# Patient Record
Sex: FEMALE | Race: WHITE | URBAN - METROPOLITAN AREA
[De-identification: names, ages, dates, MRNs, and addresses within clinical notes are randomized per-mention and may not be internally consistent; named-entity substitution may affect disease eponyms.]

---

## 2021-03-29 ENCOUNTER — OFFICE VISIT (OUTPATIENT)
Dept: FAMILY MEDICINE CLINIC | Facility: CLINIC | Age: 31
End: 2021-03-29
Payer: COMMERCIAL

## 2021-03-29 VITALS
WEIGHT: 165 LBS | TEMPERATURE: 99 F | HEART RATE: 88 BPM | HEIGHT: 61 IN | BODY MASS INDEX: 31.15 KG/M2 | OXYGEN SATURATION: 99 % | RESPIRATION RATE: 18 BRPM

## 2021-03-29 DIAGNOSIS — Z23 NEED FOR TDAP VACCINATION: Primary | ICD-10-CM

## 2021-03-29 PROCEDURE — 3008F BODY MASS INDEX DOCD: CPT | Performed by: PHYSICIAN ASSISTANT

## 2021-03-29 PROCEDURE — 90471 IMMUNIZATION ADMIN: CPT | Performed by: PHYSICIAN ASSISTANT

## 2021-03-29 PROCEDURE — 90715 TDAP VACCINE 7 YRS/> IM: CPT | Performed by: PHYSICIAN ASSISTANT

## 2021-03-29 NOTE — PROGRESS NOTES
Here for tdap vaccine. Reports no previous reaction to any vaccine. Feels well today. Consent completed and reviewed. VIS given. TDAP vaccine administered to left deltoid. Pt tolerated well.

## 2024-02-16 ENCOUNTER — LAB ENCOUNTER (OUTPATIENT)
Dept: LAB | Age: 34
End: 2024-02-16
Payer: COMMERCIAL

## 2024-02-16 ENCOUNTER — OFFICE VISIT (OUTPATIENT)
Dept: FAMILY MEDICINE CLINIC | Facility: CLINIC | Age: 34
End: 2024-02-16
Payer: COMMERCIAL

## 2024-02-16 VITALS
HEART RATE: 85 BPM | BODY MASS INDEX: 40.31 KG/M2 | TEMPERATURE: 98 F | HEIGHT: 61 IN | WEIGHT: 213.5 LBS | DIASTOLIC BLOOD PRESSURE: 81 MMHG | RESPIRATION RATE: 16 BRPM | OXYGEN SATURATION: 98 % | SYSTOLIC BLOOD PRESSURE: 117 MMHG

## 2024-02-16 DIAGNOSIS — Z71.6 ENCOUNTER FOR SMOKING CESSATION COUNSELING: ICD-10-CM

## 2024-02-16 DIAGNOSIS — E66.01 CLASS 3 SEVERE OBESITY WITHOUT SERIOUS COMORBIDITY WITH BODY MASS INDEX (BMI) OF 40.0 TO 44.9 IN ADULT, UNSPECIFIED OBESITY TYPE (HCC): ICD-10-CM

## 2024-02-16 DIAGNOSIS — Z00.00 ROUTINE PHYSICAL EXAMINATION: ICD-10-CM

## 2024-02-16 DIAGNOSIS — N94.6 DYSMENORRHEA: ICD-10-CM

## 2024-02-16 DIAGNOSIS — Z00.00 ROUTINE PHYSICAL EXAMINATION: Primary | ICD-10-CM

## 2024-02-16 DIAGNOSIS — K64.9 HEMORRHOIDS, UNSPECIFIED HEMORRHOID TYPE: ICD-10-CM

## 2024-02-16 LAB
ALBUMIN SERPL-MCNC: 4.3 G/DL (ref 3.2–4.8)
ALBUMIN/GLOB SERPL: 1.3 {RATIO} (ref 1–2)
ALP LIVER SERPL-CCNC: 47 U/L
ALT SERPL-CCNC: 20 U/L
ANION GAP SERPL CALC-SCNC: 6 MMOL/L (ref 0–18)
AST SERPL-CCNC: 20 U/L (ref ?–34)
BASOPHILS # BLD AUTO: 0.04 X10(3) UL (ref 0–0.2)
BASOPHILS NFR BLD AUTO: 0.5 %
BILIRUB SERPL-MCNC: 0.3 MG/DL (ref 0.3–1.2)
BUN BLD-MCNC: 14 MG/DL (ref 9–23)
BUN/CREAT SERPL: 12.7 (ref 10–20)
CALCIUM BLD-MCNC: 9.5 MG/DL (ref 8.7–10.4)
CHLORIDE SERPL-SCNC: 107 MMOL/L (ref 98–112)
CHOLEST SERPL-MCNC: 183 MG/DL (ref ?–200)
CO2 SERPL-SCNC: 27 MMOL/L (ref 21–32)
CREAT BLD-MCNC: 1.1 MG/DL
DEPRECATED HBV CORE AB SER IA-ACNC: 23.5 NG/ML
DEPRECATED RDW RBC AUTO: 42.2 FL (ref 35.1–46.3)
EGFRCR SERPLBLD CKD-EPI 2021: 68 ML/MIN/1.73M2 (ref 60–?)
EOSINOPHIL # BLD AUTO: 0.23 X10(3) UL (ref 0–0.7)
EOSINOPHIL NFR BLD AUTO: 2.7 %
ERYTHROCYTE [DISTWIDTH] IN BLOOD BY AUTOMATED COUNT: 12.8 % (ref 11–15)
FASTING PATIENT LIPID ANSWER: NO
FASTING STATUS PATIENT QL REPORTED: NO
GLOBULIN PLAS-MCNC: 3.2 G/DL (ref 2.8–4.4)
GLUCOSE BLD-MCNC: 95 MG/DL (ref 70–99)
HCT VFR BLD AUTO: 34.4 %
HDLC SERPL-MCNC: 50 MG/DL (ref 40–59)
HGB BLD-MCNC: 11.6 G/DL
IMM GRANULOCYTES # BLD AUTO: 0.02 X10(3) UL (ref 0–1)
IMM GRANULOCYTES NFR BLD: 0.2 %
IRON SATN MFR SERPL: 17 %
IRON SERPL-MCNC: 53 UG/DL
LDLC SERPL CALC-MCNC: 96 MG/DL (ref ?–100)
LYMPHOCYTES # BLD AUTO: 3.4 X10(3) UL (ref 1–4)
LYMPHOCYTES NFR BLD AUTO: 39.3 %
MCH RBC QN AUTO: 30.2 PG (ref 26–34)
MCHC RBC AUTO-ENTMCNC: 33.7 G/DL (ref 31–37)
MCV RBC AUTO: 89.6 FL
MONOCYTES # BLD AUTO: 0.58 X10(3) UL (ref 0.1–1)
MONOCYTES NFR BLD AUTO: 6.7 %
NEUTROPHILS # BLD AUTO: 4.39 X10 (3) UL (ref 1.5–7.7)
NEUTROPHILS # BLD AUTO: 4.39 X10(3) UL (ref 1.5–7.7)
NEUTROPHILS NFR BLD AUTO: 50.6 %
NONHDLC SERPL-MCNC: 133 MG/DL (ref ?–130)
OSMOLALITY SERPL CALC.SUM OF ELEC: 290 MOSM/KG (ref 275–295)
PLATELET # BLD AUTO: 339 10(3)UL (ref 150–450)
POTASSIUM SERPL-SCNC: 4 MMOL/L (ref 3.5–5.1)
PROT SERPL-MCNC: 7.5 G/DL (ref 5.7–8.2)
RBC # BLD AUTO: 3.84 X10(6)UL
SODIUM SERPL-SCNC: 140 MMOL/L (ref 136–145)
TIBC SERPL-MCNC: 317 UG/DL (ref 250–425)
TRANSFERRIN SERPL-MCNC: 213 MG/DL (ref 250–380)
TRIGL SERPL-MCNC: 220 MG/DL (ref 30–149)
TSI SER-ACNC: 0.69 MIU/ML (ref 0.55–4.78)
VLDLC SERPL CALC-MCNC: 36 MG/DL (ref 0–30)
WBC # BLD AUTO: 8.7 X10(3) UL (ref 4–11)

## 2024-02-16 PROCEDURE — 84466 ASSAY OF TRANSFERRIN: CPT

## 2024-02-16 PROCEDURE — 80061 LIPID PANEL: CPT

## 2024-02-16 PROCEDURE — 83036 HEMOGLOBIN GLYCOSYLATED A1C: CPT

## 2024-02-16 PROCEDURE — 84443 ASSAY THYROID STIM HORMONE: CPT

## 2024-02-16 PROCEDURE — 83540 ASSAY OF IRON: CPT

## 2024-02-16 PROCEDURE — 36415 COLL VENOUS BLD VENIPUNCTURE: CPT

## 2024-02-16 PROCEDURE — 99385 PREV VISIT NEW AGE 18-39: CPT

## 2024-02-16 PROCEDURE — 82728 ASSAY OF FERRITIN: CPT

## 2024-02-16 PROCEDURE — 85025 COMPLETE CBC W/AUTO DIFF WBC: CPT

## 2024-02-16 PROCEDURE — 80053 COMPREHEN METABOLIC PANEL: CPT

## 2024-02-16 RX ORDER — CEPHALEXIN 500 MG/1
CAPSULE ORAL
COMMUNITY
End: 2024-02-16 | Stop reason: ALTCHOICE

## 2024-02-16 RX ORDER — HYDROCODONE BITARTRATE AND ACETAMINOPHEN 5; 325 MG/1; MG/1
TABLET ORAL
COMMUNITY
End: 2024-02-16 | Stop reason: ALTCHOICE

## 2024-02-16 RX ORDER — HYDROCORTISONE 25 MG/G
CREAM TOPICAL
Qty: 28 G | Refills: 0 | Status: SHIPPED | OUTPATIENT
Start: 2024-02-16

## 2024-02-16 RX ORDER — HYDROCORTISONE ACETATE 25 MG/1
25 SUPPOSITORY RECTAL 2 TIMES DAILY
Qty: 14 SUPPOSITORY | Refills: 0 | Status: SHIPPED | OUTPATIENT
Start: 2024-02-16 | End: 2024-02-23

## 2024-02-16 RX ORDER — AMOXICILLIN AND CLAVULANATE POTASSIUM 875; 125 MG/1; MG/1
TABLET, FILM COATED ORAL
COMMUNITY
End: 2024-02-16 | Stop reason: ALTCHOICE

## 2024-02-16 RX ORDER — LEVONORGESTREL AND ETHINYL ESTRADIOL 0.1-0.02MG
1 KIT ORAL DAILY
Qty: 28 TABLET | Refills: 2 | Status: SHIPPED | OUTPATIENT
Start: 2024-02-16 | End: 2025-02-15

## 2024-02-16 RX ORDER — NICOTINE 21 MG/24HR
1 PATCH, TRANSDERMAL 24 HOURS TRANSDERMAL EVERY 24 HOURS
Qty: 28 EACH | Refills: 0 | Status: SHIPPED | OUTPATIENT
Start: 2024-02-16

## 2024-02-16 NOTE — PROGRESS NOTES
HPI:    Patient ID: Barbara Lan is a 33 year old female.    HPI  Chief Complaint   Patient presents with    Physical     Routine px; Will see gyne for pap in April (pt refused today); discuss pain with period recently     Patient here in office for physical.  States last Pap completed over 3-5 years ago, was see OB/GYN in in April 2024 for exam.  Complains of dysmenorrhea with a last menstrual cycle.  Interested in starting oral contraceptives.  States she smokes cigarettes (1 pack every 2 weeks).  Interested in smoking cessation.      Also complains of rectal pain, occurred 1 week prior to her recent menstrual cycle.  Per patient, she also noted blood when wiping with tissue.  Reports mild constipation prior to menstrual cycle.       Review of Systems   Constitutional: Negative.  Negative for fever.   HENT: Negative.  Negative for ear pain and sore throat.    Eyes:  Negative for visual disturbance.   Respiratory: Negative.  Negative for cough and shortness of breath.    Cardiovascular: Negative.  Negative for chest pain and palpitations.   Gastrointestinal:  Positive for constipation and rectal pain. Negative for abdominal pain and blood in stool.   Genitourinary:  Positive for menstrual problem. Negative for dysuria, enuresis, frequency and urgency.   Musculoskeletal: Negative.  Negative for arthralgias and myalgias.   Skin: Negative.  Negative for rash.   Neurological: Negative.  Negative for dizziness, light-headedness and headaches.   Hematological:  Does not bruise/bleed easily.   Psychiatric/Behavioral: Negative.         /81   Pulse 85   Temp 98.4 °F (36.9 °C) (Temporal)   Resp 16   Ht 5' 1\" (1.549 m)   Wt 213 lb 8 oz (96.8 kg)   LMP 02/01/2024 (Exact Date)   SpO2 98%   BMI 40.34 kg/m²     Past Medical History:   Diagnosis Date    Recurrent acute otitis media      Past Surgical History:   Procedure Laterality Date    BREAST SURGERY N/A 2015    Breast duct removal surgery     Social History      Socioeconomic History    Marital status:      Spouse name: Not on file    Number of children: Not on file    Years of education: Not on file    Highest education level: Not on file   Occupational History    Not on file   Tobacco Use    Smoking status: Some Days     Types: Cigarettes    Smokeless tobacco: Never   Vaping Use    Vaping Use: Never used   Substance and Sexual Activity    Alcohol use: Yes     Comment: socially    Drug use: Not Currently    Sexual activity: Not on file   Other Topics Concern    Not on file   Social History Narrative    Not on file     Social Determinants of Health     Financial Resource Strain: Not on file   Food Insecurity: Not on file   Transportation Needs: Not on file   Physical Activity: Not on file   Stress: Not on file   Social Connections: Not on file   Housing Stability: Not on file     Family History   Problem Relation Age of Onset    Diabetes Maternal Grandmother        Immunization History   Administered Date(s) Administered    Covid-19 Vaccine Handmade Mobile (J&J) 0.5ml 06/24/2021    TDAP 03/29/2021       Health Maintenance   Topic Date Due    Annual Physical  Never done    Tobacco Cessation Counseling 1 Year  Never done    Pap Smear  Never done    Influenza Vaccine (1) 01/01/2025 (Originally 10/1/2023)    COVID-19 Vaccine (2 - 2023-24 season) 01/01/2025 (Originally 9/1/2023)    DTaP,Tdap,and Td Vaccines (2 - Td or Tdap) 03/29/2031    Annual Depression Screening  Completed    Pneumococcal Vaccine: Birth to 64yrs  Aged Out          Current Outpatient Medications   Medication Sig Dispense Refill    nicotine (NICODERM CQ) 14 MG/24HR Transdermal Patch 24 Hr Place 1 patch onto the skin daily. 28 each 0    Levonorgestrel-Ethinyl Estrad (LESSINA) 0.1-20 MG-MCG Oral Tab Take 1 tablet by mouth daily. 28 tablet 2    hydrocortisone (ANUSOL-HC) 25 MG Rectal Suppos Place 1 suppository (25 mg total) rectally 2 (two) times daily for 7 days. 14 suppository 0    hydrocortisone 2.5 %  External Cream Apply to to affected area up to twice per day for hemorrhoids (use externally) 28 g 0     Allergies:No Known Allergies   PHYSICAL EXAM:     Chief Complaint   Patient presents with    Physical     Routine px; Will see gyne for pap in April (pt refused today); discuss pain with period recently      Physical Exam  Vitals reviewed.   Constitutional:       General: She is not in acute distress.     Appearance: Normal appearance. She is well-developed. She is not ill-appearing.   HENT:      Head: Normocephalic and atraumatic.      Right Ear: Tympanic membrane, ear canal and external ear normal. There is no impacted cerumen.      Left Ear: Tympanic membrane, ear canal and external ear normal. There is no impacted cerumen.      Nose: Nose normal. No congestion.      Mouth/Throat:      Mouth: Mucous membranes are moist.      Pharynx: No oropharyngeal exudate or posterior oropharyngeal erythema.   Eyes:      General:         Right eye: No discharge.         Left eye: No discharge.      Extraocular Movements: Extraocular movements intact.      Conjunctiva/sclera: Conjunctivae normal.      Pupils: Pupils are equal, round, and reactive to light.   Cardiovascular:      Rate and Rhythm: Normal rate and regular rhythm.      Heart sounds: Normal heart sounds. No murmur heard.     No friction rub. No gallop.   Pulmonary:      Effort: Pulmonary effort is normal. No respiratory distress.      Breath sounds: Normal breath sounds. No stridor. No wheezing, rhonchi or rales.   Chest:      Chest wall: No tenderness.   Abdominal:      General: Bowel sounds are normal. There is no distension.      Palpations: Abdomen is soft. There is no mass.      Tenderness: There is no abdominal tenderness. There is no right CVA tenderness, left CVA tenderness, guarding or rebound.      Hernia: No hernia is present.   Genitourinary:     Vagina: Normal.      Comments: Inflamed external hemorrhoid  Musculoskeletal:         General: No  tenderness. Normal range of motion.      Cervical back: Normal range of motion and neck supple.   Lymphadenopathy:      Cervical: No cervical adenopathy.   Skin:     General: Skin is warm and dry.      Findings: No rash.   Neurological:      General: No focal deficit present.      Mental Status: She is alert and oriented to person, place, and time.      Cranial Nerves: No cranial nerve deficit.      Sensory: No sensory deficit.      Motor: No weakness.      Deep Tendon Reflexes: Reflexes are normal and symmetric.   Psychiatric:         Mood and Affect: Mood normal.         Behavior: Behavior normal.         Thought Content: Thought content normal.         Judgment: Judgment normal.                ASSESSMENT/PLAN:     Return yearly for physicals  Follow up with dentist every 6 months  Follow up with eye doctor yearly  Recommend aerobic exercise for at least 30mins 5 days a week  Yearly flu shot  Tetanus booster every 10 years (Tdap/ Td)  Labs ordered/ or reviewed if done prior to appointment     Encounter Diagnoses   Name Primary?    Routine physical examination Yes    Dysmenorrhea     Encounter for smoking cessation counseling     Class 3 severe obesity without serious comorbidity with body mass index (BMI) of 40.0 to 44.9 in adult, unspecified obesity type (HCC)     Hemorrhoids, unspecified hemorrhoid type        1. Routine physical examination  - CBC With Differential With Platelet; Future  - Comp Metabolic Panel (14); Future  - Lipid Panel; Future    2. Dysmenorrhea  - Levonorgestrel-Ethinyl Estrad (LESSINA) 0.1-20 MG-MCG Oral Tab, Take 1 tablet by mouth daily.   -Advised patient that she is at a higher risk of blood clots due to smoking.  Instructed patient to go to ER for unilateral leg swelling/warmness/tenderness with shortness of breath, chest tightness, or difficulty breathing   - TSH W Reflex To Free T4 [E]; Future  - Ferritin [E]; Future  - Iron And Tibc [E]; Future    3. Encounter for smoking cessation  counseling  - nicotine (NICODERM CQ) 14 MG/24HR Transdermal Patch 24 Hr, Place 1 patch onto the skin daily.     4. Class 3 severe obesity without serious comorbidity with body mass index (BMI) of 40.0 to 44.9 in adult, unspecified obesity type (HCC)  - [facility] - BLAIR Rajput -  [facility] 9  - Hemoglobin A1C [E]; Future    5. Hemorrhoids, unspecified hemorrhoid type  - hydrocortisone (ANUSOL-HC) 25 MG Rectal Suppos,   Place 1 suppository (25 mg total) rectally 2 (two) times daily for 7 days.   - hydrocortisone 2.5 % External Cream, Apply to to affected area up to twice per day for hemorrhoids (use externally)       Orders Placed This Encounter   Procedures    CBC With Differential With Platelet    Comp Metabolic Panel (14)    Lipid Panel    TSH W Reflex To Free T4 [E]    Ferritin [E]    Iron And Tibc [E]    Hemoglobin A1C [E]       The above note was creating using Dragon speech recognition technology. Please excuse any typos    Meds This Visit:  Requested Prescriptions     Signed Prescriptions Disp Refills    nicotine (NICODERM CQ) 14 MG/24HR Transdermal Patch 24 Hr 28 each 0     Sig: Place 1 patch onto the skin daily.    Levonorgestrel-Ethinyl Estrad (LESSINA) 0.1-20 MG-MCG Oral Tab 28 tablet 2     Sig: Take 1 tablet by mouth daily.    hydrocortisone (ANUSOL-HC) 25 MG Rectal Suppos 14 suppository 0     Sig: Place 1 suppository (25 mg total) rectally 2 (two) times daily for 7 days.    hydrocortisone 2.5 % External Cream 28 g 0     Sig: Apply to to affected area up to twice per day for hemorrhoids (use externally)       Imaging & Referrals:  BARIATRICS - INTERNAL         BLAIR Schneider

## 2024-02-17 LAB
EST. AVERAGE GLUCOSE BLD GHB EST-MCNC: 114 MG/DL (ref 68–126)
HBA1C MFR BLD: 5.6 % (ref ?–5.7)

## 2024-03-14 ENCOUNTER — PATIENT MESSAGE (OUTPATIENT)
Dept: SURGERY | Facility: CLINIC | Age: 34
End: 2024-03-14

## 2024-03-18 ENCOUNTER — TELEMEDICINE (OUTPATIENT)
Dept: SURGERY | Facility: CLINIC | Age: 34
End: 2024-03-18
Payer: COMMERCIAL

## 2024-03-18 VITALS — WEIGHT: 215 LBS | BODY MASS INDEX: 41 KG/M2

## 2024-03-18 DIAGNOSIS — E66.01 MORBID OBESITY WITH BMI OF 40.0-44.9, ADULT (HCC): ICD-10-CM

## 2024-03-18 DIAGNOSIS — E78.5 DYSLIPIDEMIA: Primary | ICD-10-CM

## 2024-03-18 DIAGNOSIS — Z51.81 ENCOUNTER FOR THERAPEUTIC DRUG MONITORING: ICD-10-CM

## 2024-03-18 RX ORDER — TIRZEPATIDE 2.5 MG/.5ML
2.5 INJECTION, SOLUTION SUBCUTANEOUS WEEKLY
Qty: 2 ML | Refills: 1 | Status: SHIPPED | OUTPATIENT
Start: 2024-03-18

## 2024-03-18 NOTE — PROGRESS NOTES
Virtual Video & Audio Check-In    Barbara Lan verbally consents to a Virtual Video & Audio Check-In visit on 24.  Patient has been referred to the [facility] website at [email or web address] to review the yearly Consent to Treat document.    Patient understands and accepts financial responsibility for any deductible, co-insurance and/or co-pays associated with this service.    Duration of the service: 35 minutes.    Summary of topics discussed: Obesity/weight management, Lifestyle and behavior modifications, Medication management.    The Wellness and Weight Loss Consultation Note       Date of Consult:  3/18/2024    Patient:  Barbara Lan  :      1990  MRN:      [id number]    Referring Provider: BLAIR Lopez     Chief Complaint:    Chief Complaint   Patient presents with    Consult    Obesity    Weight Management       SUBJECTIVE     History of Present Illness:  Barbara Lan has been referred to me for evaluation and treatment.       34 yo female.  Presents to clinic for assistance with weight loss/maintenance.   Has reduced smoking significantly recently.    Patient is considering medications and is not considering bariatric surgery for weight loss.    Patient denies any history of eating disorder(s).    Patient is employed: - .  Patient lives with 2 kids, significant other.    Patient's goal weight: 170 lbs ()  Biggest weight loss in the past: 55 lbs  How weight loss was achieved: ketogenic diet  Heaviest weight ever: 230 lbs   Previous use of medical weight loss medications: None     Physical activity: 60 minutes 3 days/week- gym, videos    Sleep: 6 hours/night    Sleep screening:       Past Medical History:   Past Medical History:   Diagnosis Date    Recurrent acute otitis media        OBJECTIVE     Vitals: Wt 215 lb (97.5 kg)   LMP 2024 (Exact Date)   BMI 40.62 kg/m²        Wt Readings from Last 6 Encounters:   24 215 lb (97.5 kg)   24 213  lb 8 oz (96.8 kg)   03/29/21 165 lb (74.8 kg)        Patient Medications:    Current Outpatient Medications   Medication Sig Dispense Refill    Tirzepatide-Weight Management (ZEPBOUND) 2.5 MG/0.5ML Subcutaneous Solution Auto-injector Inject 2.5 mg into the skin once a week. 2 mL 1    hydrocortisone 2.5 % External Cream Apply to to affected area up to twice per day for hemorrhoids (use externally) 28 g 0       Allergies:  Patient has no known allergies.     Comorbidities:  Dyslipidemia     Social History:  Reviewed     Surgical History:    Past Surgical History:   Procedure Laterality Date    BREAST SURGERY N/A 2015    Breast duct removal surgery       Family History:    Family History   Problem Relation Age of Onset    Diabetes Maternal Grandmother        Typical Dietary Intake:  Breakfast AM Snack Lunch PM Snack Dinner   2 cups coffee day  Banana  Yogurt fruit Fruit  Granola bar 5 PM chicken, rice, veggies     After dinner behavior: + fruit   Night eating: -  Portion sizes: -  Binge:   Emotional: +  Depression:  Grazing:   Sweet tooth:  Crunchy/salty:   Etoh: Rare   Drinks mostly water  Soda Drinker: No    Sports Drinks:  No    Juice:  No   Number of restaurant or fast food meals/week:  2 meals/week    Nutritional Goals Reviewed and Discussed:     Eat 3-4 cups of fresh fruit or vegetables daily    Behavior Modifications Reviewed and Discussed:    Eat breakfast, Eat 3 meals per day, Plan meals in advance, Read nutrition labels, Drink 64oz of water per day, Maintain a daily food journal, Utilize portion control strategies to reduce calorie intake, Identify triggers for eating and manage cues, and Eat slowly and take 20 to 30 minutes to complete each meal      ROS:  Constitutional: positive for fatigue  Respiratory: negative  Cardiovascular: negative  Gastrointestinal: negative  Integument/breast: negative  Hematologic/lymphatic: negative  Musculoskeletal:negative  Neurological: negative  Behavioral/Psych:  negative  Endocrine: negative    Physical Exam:  General appearance: alert, appears stated age, cooperative and obese  Head: Normocephalic, without obvious abnormality, atraumatic  Neck: no adenopathy, no carotid bruit, no JVD, supple, symmetrical, trachea midline and thyroid not enlarged, symmetric, no tenderness/mass/nodules  Lungs: clear to auscultation bilaterally  Heart: S1, S2 normal, no murmur, click, rub or gallop, regular rate and rhythm  Abdomen: soft, non-tender; bowel sounds normal; no masses,  no organomegaly and abdomen obese   Extremities: intact, no edema   Pulses: 2+ and symmetric  Skin: intact   Neurologic: Grossly normal      ASSESSMENT         Encounter Diagnosis(ses):   1. Dyslipidemia    2. Morbid obesity with BMI of 40.0-44.9, adult ([facility])    3. Encounter for therapeutic drug monitoring        PLAN         Diagnoses and all orders for this visit:    Dyslipidemia  -     DIETITIAN EDUCATION INITIAL, DIET (INTERNAL)  -     EKG 12 Lead; Future    Morbid obesity with BMI of 40.0-44.9, adult ([facility])  -     Tirzepatide-Weight Management (ZEPBOUND) 2.5 MG/0.5ML Subcutaneous Solution Auto-injector; Inject 2.5 mg into the skin once a week.  -     DIETITIAN EDUCATION INITIAL, DIET (INTERNAL)  -     EKG 12 Lead; Future    Encounter for therapeutic drug monitoring  -     DIETITIAN EDUCATION INITIAL, DIET (INTERNAL)  -     EKG 12 Lead; Future      DYSLIPIDEMIA: Recommend dietary changes and lifestyle modifications as discussed below. Monitor.     Lab Results   Component Value Date/Time    CHOLEST 183 02/16/2024 03:54 PM    LDL 96 02/16/2024 03:54 PM    HDL 50 02/16/2024 03:54 PM    TRIG 220 (H) 02/16/2024 03:54 PM    VLDL 36 (H) 02/16/2024 03:54 PM       OBESITY/WEIGHT GAIN:    Recommended intensive lifestyle and behavioral modifications at this time for weight loss.    Educated patient on lifestyle modifications: Whole Food/Plant Strong/Low Glycemic Index diet, moderate alcohol consumption, reduced sodium  intake to no more than 2,400 mg/day, and at least 150 minutes of moderate physical activity per week.  Avoid processed, poor quality carbohydrates, refined grains, flour, sugar.    Goals for next month:  1. Keep a food log.  2. Drink 64 ounces of non-caloric beverages per day. No fruit juices or regular soda.  3. Aim for 150 minutes moderate exercise per week.    4. Increase fruit and vegetable servings to 5-6 per day.    5. Improve sleep and stress.     Reviewed other labs:  2/16/24- a1c 5.6.  Iron, ferritin, TSH, CMP,   H & H low- mild anemia.   Recommend iron rich foods.     Discussed medication options for weight loss in detail with patient.     Denies personal or family hx medullary thyroid CA, endocrine neoplasia syndrome, pancreatitis hx, suicidal ideation. No renal impairment, severe GI disease, diabetes, pancreatitis risks noted.    Start Zepbound 2.5 mg weekly.   Increase dose monthly as tolerated.    Alternative: phentermine + topiramate    SQ administration teaching provided to patient.   Discussed risks, benefits, and side effects of medication. Avoid pregnancy during use. Contraindications for medication discussed at length. Patient states understanding.     Plan for iron rich foods.     Meet with RD.  Healthy Plate Method.     RTC 2-3 months.     BLAIR Rajput

## 2024-03-18 NOTE — PATIENT INSTRUCTIONS
Zepbound schedule:    2.5 mg once a week for 4 consecutive weeks, then may increase  5 mg once a week for 4 consecutive weeks, then may increase  7.5 mg once a week for 4 consecutive weeks, then may increase  10 mg once a week for 4 consecutive weeks, then may increase  12.5 mg once a week for 4 consecutive weeks, then may increase  15 mg once a week- final dose     Each month, please message me to let me know how you are doing. I can either refill the medication to a higher dose as stated above, or you will have refills at the current/same dose until you work through the side effects which are typically nausea, vomiting, GI upset, heart burn, constipation, loose stool, and/or fatigue.     For now, please start bariatric choice + iron. Choose to eat iron-rich foods. Foods rich in iron include: poultry, seafood, beans, dark green leafy vegetables such as spinach, peas, and almonds.     Aim for 75-80 grams protein/day.  25-30 grams/meal.   3 PM nuts/seeds.   Eat within 1-3 hours upon waking.   Meals between 7 or 9 AM and 7 PM.   6 days on, 1 day off.   Cronometer for tracking.  Add psyllium husk daily. [employer].   Build up to 35-40 grams of fiber/day.   Aim for 64 oz of water/day.    Aim for a total of:  2 fruits a day (avocado, tomato, citrus/oranges, apples, berries)  1/2 cup or medium size    4 non-starchy vegetables/day (1/2 cup cooked; 1 cup raw) (greens, peppers, onions, garlic, broccoli, cauliflower, brussels sprouts, asparagus, etc.)    0-2 starches/day (oatmeal, sweet potato, carrots, brown rice, etc).    3 protein per day (fish, seafood, meat, or plants: salmon, nuts, seeds, shrimp, chicken, turkey, beans, lentils, chickpeas, etc).    2 healthy fats (avocado, avocado oil, olives, olive oil, salmon, nuts, seeds)    I recommend a whole food, plant powered diet with low glycemic index:     Aim for 3 meals a day and 1-2 snacks as needed.    Aim for a protein + produce at each meal time.     Breakfast  ideas:  1. Fruit and nuts/seeds.  2. Eggs scrambled with vegetables.  3. Oatmeal (stovetop), cinnamon, 2 tbsp flaxseed, berries, nuts.  4. Protein shake + fruit. (1 scoop with water/ice). Garden of Life Fit, Nutiva, Jones, and Orgain are good brands.      Snacks:  Raw vegetables and hummus, apples and peanut butter, nuts, seeds, fruit, pecans drizzled with organic honey.      Use the Healthy Plate method for lunch and dinner:  1/2 right side of plate non-starchy vegetables.  Bottom left 1/4 plate protein.  Top left 1/4 starch as desired.      Aim for 150 minutes moderate level exercise weekly with 2-3 days strength training.    Add Magnesium glycinate 200 to 500 mg/day for sleep.   Life Extension. NOW. Garden of Life. Whole Food Brand. Mary[employer]uth's. Pure Encapsulations.     Or consider Natural Calm.   by Natural Vitality  Follow dosage instructions.  Start 1 teaspoon and increase up to 3 teaspoons as needed for sleep.

## 2024-04-24 ENCOUNTER — OFFICE VISIT (OUTPATIENT)
Dept: OBGYN CLINIC | Facility: CLINIC | Age: 34
End: 2024-04-24
Payer: COMMERCIAL

## 2024-04-24 VITALS
HEIGHT: 60 IN | DIASTOLIC BLOOD PRESSURE: 76 MMHG | WEIGHT: 211 LBS | HEART RATE: 76 BPM | SYSTOLIC BLOOD PRESSURE: 109 MMHG | BODY MASS INDEX: 41.43 KG/M2

## 2024-04-24 DIAGNOSIS — Z01.419 NORMAL GYNECOLOGIC EXAMINATION: Primary | ICD-10-CM

## 2024-04-24 PROCEDURE — 99395 PREV VISIT EST AGE 18-39: CPT | Performed by: OBSTETRICS & GYNECOLOGY

## 2024-04-24 RX ORDER — NORETHINDRONE ACETATE AND ETHINYL ESTRADIOL, ETHINYL ESTRADIOL AND FERROUS FUMARATE 1MG-10(24)
1 KIT ORAL DAILY
Qty: 84 TABLET | Refills: 4 | Status: SHIPPED | OUTPATIENT
Start: 2024-04-24 | End: 2025-04-24

## 2024-04-24 NOTE — PROGRESS NOTES
Barbara Lan is a 33 year old female  Patient's last menstrual period was 2024 (exact date).   Chief Complaint   Patient presents with    Annual     Pt here  for annual and discuss irregular menses had reaction to birthcontrol     Pt not taking contraception. States cramps bad during menses.   OBSTETRICS HISTORY:     OB History    Para Term  AB Living   2 2 2         SAB IAB Ectopic Multiple Live Births                  # Outcome Date GA Lbr Antonio/2nd Weight Sex Type Anes PTL Lv   2 Term 13     NORMAL SPONT      1 Term 08     NORMAL SPONT          GYNE HISTORY:     Hx Prior Abnormal Pap: No  Pap Result Notes: per pt normal 2 years ago   Period Cycle (Days): 28 (2024  3:45 PM)  Period Duration (Days): 7 (2024  3:45 PM)  Use of Birth Control (if yes, specify type): None (2024  3:45 PM)  Hx Prior Abnormal Pap: No (2024  3:45 PM)  Pap Result Notes: per pt normal 2 years ago (2024  3:45 PM)         No data to display                  MEDICAL HISTORY:     Past Medical History:    Recurrent acute otitis media       SURGICAL HISTORY:     Past Surgical History:   Procedure Laterality Date    Breast surgery N/A     Breast duct removal surgery       SOCIAL HISTORY:     Social History     Socioeconomic History    Marital status:    Tobacco Use    Smoking status: Some Days     Types: Cigarettes    Smokeless tobacco: Never   Vaping Use    Vaping status: Never Used   Substance and Sexual Activity    Alcohol use: Yes     Comment: socially    Drug use: Not Currently        FAMILY HISTORY:     Family History   Problem Relation Age of Onset    Diabetes Maternal Grandmother        MEDICATIONS:       Current Outpatient Medications:     Norethin-Eth Estrad-Fe Biphas (LO LOESTRIN FE) 1 MG-10 MCG / 10 MCG Oral Tab, Take 1 tablet by mouth daily., Disp: 84 tablet, Rfl: 4    hydrocortisone 2.5 % External Cream, Apply to to affected area up to twice per day for  hemorrhoids (use externally), Disp: 28 g, Rfl: 0    Tirzepatide-Weight Management (ZEPBOUND) 2.5 MG/0.5ML Subcutaneous Solution Auto-injector, Inject 2.5 mg into the skin once a week. (Patient not taking: Reported on 4/24/2024), Disp: 2 mL, Rfl: 1    ALLERGIES:     No Known Allergies      REVIEW OF SYSTEMS:     Constitutional:    denies fever / chills  Eyes:     denies blurred or double vision  Cardiovascular:  denies chest pain or palpitations  Respiratory:    denies shortness of breath  Gastrointestinal:  denies severe abdominal pain, frequent diarrhea or constipation, nausea / vomiting  Genitourinary:    denies dysuria, bothersome incontinence  Skin/Breast:   denies any breast pain, lumps, or discharge  Neurological:    denies frequent severe headaches  Psychiatric:   denies depression or anxiety, thoughts of harming self or others  Heme/Lymph:    denies easy bruising or bleeding      PHYSICAL EXAM:   Blood pressure 109/76, pulse 76, height 5' (1.524 m), weight 211 lb (95.7 kg), last menstrual period 04/09/2024, not currently breastfeeding.  Constitutional:  well developed, well nourished  Head/Face:  normocephalic  Neck/Thyroid: thyroid symmetric, no thyromegaly, no nodules, no adenopathy  Lymphatic: no abnormal supraclavicular or axillary adenopathy is noted  Respiratory:      chest wall symmetric and nontender on palpation, clear to asculation bilateral, no wheezing, rales, ronchi, and resonance normal upon percussion  Cardiovascular: chest normal in appearance, regular rate and rhythm, no murmurs, PMI palpated midclavicular line  Breast:   normal without palpable masses, tenderness, asymmetry, nipple discharge, nipple retraction or skin changes  Abdomen:   soft, nontender, nondistended, no masses  Skin/Hair:  no unusual rashes or bruises  Extremities:  no edema, no cyanosis, non tender bilaterally  Psychiatric:   oriented to time, place, person and situation. Appropriate mood and affect    Pelvic  Exam:  External Genitalia:  normal appearance, hair distribution, and no lesions  Urethral Meatus:   normal in size, location, without lesions   Bladder:    no fullness, masses or tenderness  Vagina:    normal appearance without lesions, no abnormal discharge  Cervix:    No lesions, normal friability   Uterus:    normal in size, 8 wk sized, normal contour, position, mobility, without  motion tenderness  Adnexa:   normal without masses or tenderness  Perineum:   normal  Anus: no hemorroids         ASSESSMENT & PLAN:     Barbara was seen today for annual.    Diagnoses and all orders for this visit:    Normal gynecologic examination  -     ThinPrep Pap with HPV Reflex, Chlamydia/GC; Future  -     Chlamydia/Gc Amplification; Future  -     Norethin-Eth Estrad-Fe Biphas (LO LOESTRIN FE) 1 MG-10 MCG / 10 MCG Oral Tab; Take 1 tablet by mouth daily.  Nutrition, weight screening and exercise were discussed with the patient.  Exercise should encompass approximately 150 minutes/week.  Self breast exam counseling was also given.  I advised the patient to avoid tobacco, drugs and alcohol.  Influenza vaccine was offered if seasonally appropriate.  HPV and STD prevention counseling was given.  Health maintenance checklist  was reviewed including Pap smear, cervical cultures, and mammogram screening if age-appropriate.  Appropriate follow-up scheduling was discussed with the patient.    Pap done  Contraceptive counseling given.  Efficacy, side effects, risks and benefits of oral contraceptive pills, NuvaRing, Depo-Provera, intrauterine device, patch, Nexplanon, abstinence, condoms, and permanent sterilization were discussed with the patient and patient verbalized understanding and all questions were answered.  I advised condoms or abstinence as backup during the first month of contraception use for pregnancy prevention and I advised condom use at all times for HPV and STD prevention if clinically appropriate.  Pt wants ocp  A  prescription for oral contraceptive pills was given for 13 months use.  Backup contraception during the first 28 days of use was advised.  Proper use of the pill including attempts at taking the pill at the same time every day discussed with the patient.  Doubling up the day after missed pill day was advised.  If 2 days are missed then backup contraception for another 28 days was advised.  Risks of increased blood clot formation with use of the pill was discussed with the patient.  She denies history of blood clots, liver disease, clotting disorders, breast cancer, or depression.  Benefits of the pill including proposed decrease in ovarian and endometrial cancer were discussed with the patient.  Light menses during use of the pill or amenorrhea during use of the pill are both normal and discussed with the patient.  The patient does not have hypertension or uncontrolled diabetes.  Presc loloestrin given        FOLLOW-UP     Return in about 1 year (around 4/24/2025) for Annual exam.      Jerry Putnam MD  4/24/2024

## 2024-04-25 LAB
C TRACH DNA SPEC QL NAA+PROBE: NEGATIVE
N GONORRHOEA DNA SPEC QL NAA+PROBE: NEGATIVE

## 2024-05-14 LAB — HPV I/H RISK 1 DNA SPEC QL NAA+PROBE: NEGATIVE

## 2024-08-07 DIAGNOSIS — Z01.419 NORMAL GYNECOLOGIC EXAMINATION: ICD-10-CM

## 2024-08-20 RX ORDER — NORETHINDRONE ACETATE AND ETHINYL ESTRADIOL, ETHINYL ESTRADIOL AND FERROUS FUMARATE 1MG-10(24)
1 KIT ORAL DAILY
Qty: 84 TABLET | Refills: 4 | Status: SHIPPED | OUTPATIENT
Start: 2024-08-20 | End: 2025-08-20

## 2025-04-30 ENCOUNTER — OFFICE VISIT (OUTPATIENT)
Dept: OBGYN CLINIC | Facility: CLINIC | Age: 35
End: 2025-04-30
Payer: COMMERCIAL

## 2025-04-30 ENCOUNTER — TELEPHONE (OUTPATIENT)
Dept: OBGYN CLINIC | Facility: CLINIC | Age: 35
End: 2025-04-30

## 2025-04-30 VITALS
WEIGHT: 202 LBS | HEIGHT: 60 IN | SYSTOLIC BLOOD PRESSURE: 108 MMHG | DIASTOLIC BLOOD PRESSURE: 72 MMHG | BODY MASS INDEX: 39.66 KG/M2

## 2025-04-30 DIAGNOSIS — Z71.6 ENCOUNTER FOR TOBACCO USE CESSATION COUNSELING: ICD-10-CM

## 2025-04-30 DIAGNOSIS — Z01.419 NORMAL GYNECOLOGIC EXAMINATION: Primary | ICD-10-CM

## 2025-04-30 PROCEDURE — 99395 PREV VISIT EST AGE 18-39: CPT | Performed by: OBSTETRICS & GYNECOLOGY

## 2025-04-30 RX ORDER — DROSPIRENONE 4 MG/1
1 TABLET, FILM COATED ORAL DAILY
Qty: 84 TABLET | Refills: 4 | Status: SHIPPED | OUTPATIENT
Start: 2025-04-30 | End: 2026-04-30

## 2025-04-30 NOTE — PROGRESS NOTES
Barbara Lan is a 34 year old female  Patient's last menstrual period was 2025.   Chief Complaint   Patient presents with    Annual     Pt here for annual exam      Taking ocp, loestrin fe. She smokes.   OBSTETRICS HISTORY:     OB History    Para Term  AB Living   2 2 2      SAB IAB Ectopic Multiple Live Births             # Outcome Date GA Lbr Antonio/2nd Weight Sex Type Anes PTL Lv   2 Term 13     NORMAL SPONT      1 Term 08     NORMAL SPONT          GYNE HISTORY:     Hx Prior Abnormal Pap: No  Pap Date: 24  Pap Result Notes: wnl   Period Cycle (Days): irregular (2025  2:02 PM)  Period Duration (Days): 4 (2025  2:02 PM)  Use of Birth Control (if yes, specify type): OCP (2025  2:02 PM)  Hx Prior Abnormal Pap: No (2025  2:02 PM)  Pap Date: 24 (2025  2:02 PM)  Pap Result Notes: wnl (2025  2:02 PM)        Latest Ref Rng & Units 2024     4:51 PM   RECENT PAP RESULTS   INTERPRETATION/RESULT: Negative for intraepithelial lesion or malignancy Negative for intraepithelial lesion or malignancy    HPV Negative Negative          MEDICAL HISTORY:     Past Medical History[1]    SURGICAL HISTORY:     Past Surgical History[2]    SOCIAL HISTORY:     Short Social Hx on File[3]     FAMILY HISTORY:     Family History[4]    MEDICATIONS:     Medications - Current[5]    ALLERGIES:     Allergies[6]      REVIEW OF SYSTEMS:     Constitutional:    denies fever / chills  Eyes:     denies blurred or double vision  Cardiovascular:  denies chest pain or palpitations  Respiratory:    denies shortness of breath  Gastrointestinal:  denies severe abdominal pain, frequent diarrhea or constipation, nausea / vomiting  Genitourinary:    denies dysuria, bothersome incontinence  Skin/Breast:   denies any breast pain, lumps, or discharge  Neurological:    denies frequent severe headaches  Psychiatric:   denies depression or anxiety, thoughts of harming self or  others  Heme/Lymph:    denies easy bruising or bleeding      PHYSICAL EXAM:   Blood pressure 108/72, height 5' (1.524 m), weight 202 lb (91.6 kg), last menstrual period 04/04/2025, not currently breastfeeding.  Constitutional:  well developed, well nourished  Head/Face:  normocephalic  Neck/Thyroid: thyroid symmetric, no thyromegaly, no nodules, no adenopathy  Lymphatic: no abnormal supraclavicular or axillary adenopathy is noted  Respiratory:      chest wall symmetric and nontender on palpation, clear to asculation bilateral, no wheezing, rales, ronchi, and resonance normal upon percussion  Cardiovascular: chest normal in appearance, regular rate and rhythm, no murmurs, PMI palpated midclavicular line  Breast:   normal bilaterally without palpable masses, tenderness, asymmetry, nipple discharge, nipple retraction or skin changes bilaterally  Abdomen:   soft, nontender, nondistended, no masses  Skin/Hair:  no unusual rashes or bruises  Extremities:  no edema, no cyanosis, non tender bilaterally  Psychiatric:   oriented to time, place, person and situation. Appropriate mood and affect    Pelvic Exam:  External Genitalia:  normal appearance, hair distribution, and no lesions  Urethral Meatus:   normal in size, location, without lesions   Bladder:    no fullness, masses or tenderness  Vagina:    normal appearance without lesions, no abnormal discharge  Cervix:    No lesions, normal friability   Uterus:    normal in size, 8 wk sized, normal contour, position, mobility, without  motion tenderness  Adnexa:   normal without masses or tenderness  Perineum:   normal  Anus: no hemorroids         ASSESSMENT & PLAN:     Barbara was seen today for annual.    Diagnoses and all orders for this visit:    Normal gynecologic examination  -     ThinPrep Pap with HPV Reflex, Chlamydia/GC; Future  -     Chlamydia/Gc Amplification; Future  -     Drospirenone (SLYND) 4 MG Oral Tab; Take 1 tablet by mouth daily.    Encounter for tobacco use  cessation counseling  -     Smoking Cessation less than 3 minutes      Nutrition, weight screening and exercise were discussed with the patient.  Exercise should encompass approximately 150 minutes/week.  Self breast exam counseling was also given.  I advised the patient to avoid tobacco, drugs and alcohol.  Influenza vaccine was offered if seasonally appropriate.  HPV and STD prevention counseling was given.  Health maintenance checklist  was reviewed including Pap smear, cervical cultures, and mammogram screening if age-appropriate.  Appropriate follow-up scheduling was discussed with the patient.    Pap done  Contraceptive counseling given.  Efficacy, side effects, risks and benefits of oral contraceptive pills, NuvaRing, Depo-Provera, intrauterine device, patch, Nexplanon, abstinence, condoms, and permanent sterilization were discussed with the patient and patient verbalized understanding and all questions were answered.  I advised condoms or abstinence as backup during the first month of contraception use for pregnancy prevention and I advised condom use at all times for HPV and STD prevention if clinically appropriate.  Will refill with slynd as occasional tobacco user    FOLLOW-UP     Return in about 1 year (around 4/30/2026) for Annual exam.      Jerry Putnam MD  4/30/2025       [1]   Past Medical History:   Recurrent acute otitis media   [2]   Past Surgical History:  Procedure Laterality Date    Breast surgery N/A 2015    Breast duct removal surgery   [3]   Social History  Socioeconomic History    Marital status:    Tobacco Use    Smoking status: Some Days     Types: Cigarettes    Smokeless tobacco: Never   Vaping Use    Vaping status: Never Used   Substance and Sexual Activity    Alcohol use: Yes     Comment: socially    Drug use: Not Currently     Social Drivers of Health     Food Insecurity: No Food Insecurity (4/30/2025)    NCSS - Food Insecurity     Worried About Running Out of Food in the  Last Year: No     Ran Out of Food in the Last Year: No   Transportation Needs: No Transportation Needs (4/30/2025)    NCSS - Transportation     Lack of Transportation: No   Housing Stability: Not At Risk (4/30/2025)    NCSS - Housing/Utilities     Has Housing: Yes     Worried About Losing Housing: No     Unable to Get Utilities: No   [4]   Family History  Problem Relation Age of Onset    Diabetes Maternal Grandmother    [5]   Current Outpatient Medications:     Drospirenone (SLYND) 4 MG Oral Tab, Take 1 tablet by mouth daily., Disp: 84 tablet, Rfl: 4    Norethin-Eth Estrad-Fe Biphas (LO LOESTRIN FE) 1 MG-10 MCG / 10 MCG Oral Tab, Take 1 tablet by mouth daily., Disp: 84 tablet, Rfl: 4  [6] No Known Allergies

## 2025-05-01 LAB
C TRACH DNA SPEC QL NAA+PROBE: NEGATIVE
N GONORRHOEA DNA SPEC QL NAA+PROBE: NEGATIVE

## 2025-05-05 LAB — HPV E6+E7 MRNA CVX QL NAA+PROBE: NEGATIVE

## 2025-07-14 DIAGNOSIS — Z01.419 NORMAL GYNECOLOGIC EXAMINATION: ICD-10-CM

## 2025-07-14 RX ORDER — NORETHINDRONE ACETATE AND ETHINYL ESTRADIOL, ETHINYL ESTRADIOL AND FERROUS FUMARATE 1MG-10(24)
1 KIT ORAL DAILY
Qty: 84 TABLET | Refills: 4 | OUTPATIENT
Start: 2025-07-14 | End: 2026-07-14

## 2025-07-23 ENCOUNTER — TELEPHONE (OUTPATIENT)
Dept: OBGYN CLINIC | Facility: CLINIC | Age: 35
End: 2025-07-23

## 2025-07-23 NOTE — TELEPHONE ENCOUNTER
Left voicemail to inform could request additional refills to pharmacy since years worth recently sent. To call back with further questions.

## (undated) NOTE — MR AVS SNAPSHOT
After Visit Summary   4/24/2024    Barbara Lan   MRN: [id number]           Visit Information     Date & Time  4/24/2024  3:30 PM Provider  Jerry Putnam MD [facility] - OB/GYN Dept. Phone  [phone]      Your Vitals Were  Most recent update: 4/24/2024  3:50 PM    BP   109/76          Pulse   76          Ht   60\"          Wt   211 lb          LMP   04/09/2024 (Exact Date)             Breastfeeding   No    BMI   41.21 kg/m²         Allergies as of 4/24/2024  Review status set to Review Complete on 4/24/2024   No Known Allergies     Your Current Medications        Dosage    Norethin-Eth Estrad-Fe Biphas (LO LOESTRIN FE) 1 MG-10 MCG / 10 MCG Oral Tab Take 1 tablet by mouth daily.    hydrocortisone 2.5 % External Cream Apply to to affected area up to twice per day for hemorrhoids (use externally)    Tirzepatide-Weight Management (ZEPBOUND) 2.5 MG/0.5ML Subcutaneous Solution Auto-injector Inject 2.5 mg into the skin once a week.      Diagnoses for This Visit    Normal gynecologic examination   [[id number]]  -  Primary           Follow-up    Return in about 1 year (around 4/24/2025) for Annual exam.     We Ordered the Following     Normal Orders This Visit    Chlamydia/Gc Amplification [[id number] CUSTOM]     ThinPrep PAP with HPV Reflex Request [[id number] CUSTOM]     ThinPrep Pap with HPV Reflex, Chlamydia/GC [[id number] CUSTOM]     Future Labs/Procedures Expected by Expires    Chlamydia/Gc Amplification [[id number] CUSTOM]  4/24/2024 (Approximate) 4/24/2025    ThinPrep Pap with HPV Reflex, Chlamydia/GC [[id number] CUSTOM]  4/24/2024 4/24/2025      Future Appointments        Provider Department    6/11/2024 3:15 PM [facility]    7/22/2024 5:40 PM Evelia Perez [facility]      Follow-up Instructions    Return in about 1 year (around 4/24/2025) for Annual exam.                   Did you know that [facility] primary care physicians now offer Video Visits through [employer] for adult patients for a variety of conditions such as allergies, back pain and cold symptoms? Skip the drive and waiting room and online chat with a doctor face-to-face using your web-cam enabled computer or mobile device wherever you are. Video Visits cost $50 and can be paid hassle-free using a credit, debit, or health savings card.  Not active on [employer]? Ask us how to get signed up today!          If you receive a survey from [employer], please take a few minutes to complete it and provide feedback. We strive to deliver the best patient experience and are looking for ways to make improvements. Your feedback will help us do so. For more information on [email or web address], please visit [email or web address]           No text in SmartText           No text in SmartText

## (undated) NOTE — Clinical Note
Annalisa, I met with Barbara in clinic today for weight loss/management. I have recommended intensive lifestyle/behavioral modifications for weight loss. In addition, I have recommended she start weight loss medication, meet with our dietician, and return to clinic routinely for ongoing support.  Please let me know if you have any questions or concerns. Thank you very much for referring your patient to our clinic.   Take good care, Evelia Perez, APRN